# Patient Record
Sex: MALE | Race: WHITE | NOT HISPANIC OR LATINO | Employment: STUDENT | URBAN - METROPOLITAN AREA
[De-identification: names, ages, dates, MRNs, and addresses within clinical notes are randomized per-mention and may not be internally consistent; named-entity substitution may affect disease eponyms.]

---

## 2022-03-08 ENCOUNTER — HOSPITAL ENCOUNTER (EMERGENCY)
Facility: HOSPITAL | Age: 22
Discharge: HOME | End: 2022-03-08
Attending: EMERGENCY MEDICINE | Admitting: EMERGENCY MEDICINE

## 2022-03-08 ENCOUNTER — APPOINTMENT (OUTPATIENT)
Dept: CT IMAGING | Facility: HOSPITAL | Age: 22
End: 2022-03-08

## 2022-03-08 VITALS
RESPIRATION RATE: 16 BRPM | TEMPERATURE: 98 F | SYSTOLIC BLOOD PRESSURE: 133 MMHG | DIASTOLIC BLOOD PRESSURE: 78 MMHG | WEIGHT: 195 LBS | OXYGEN SATURATION: 97 % | BODY MASS INDEX: 27.3 KG/M2 | HEART RATE: 68 BPM | HEIGHT: 71 IN

## 2022-03-08 DIAGNOSIS — S06.6X0A SUBARACHNOID HEMORRHAGE FOLLOWING INJURY, NO LOSS OF CONSCIOUSNESS, INITIAL ENCOUNTER: Primary | ICD-10-CM

## 2022-03-08 PROCEDURE — 99283 EMERGENCY DEPT VISIT LOW MDM: CPT

## 2022-03-08 PROCEDURE — 63710000001 PREDNISONE PER 1 MG: Performed by: EMERGENCY MEDICINE

## 2022-03-08 PROCEDURE — 70450 CT HEAD/BRAIN W/O DYE: CPT

## 2022-03-08 RX ORDER — PREDNISONE 20 MG/1
20 TABLET ORAL ONCE
Status: COMPLETED | OUTPATIENT
Start: 2022-03-08 | End: 2022-03-08

## 2022-03-08 RX ADMIN — PREDNISONE 20 MG: 20 TABLET ORAL at 18:59

## 2022-03-08 NOTE — DISCHARGE INSTRUCTIONS
Do not engage in any contact sports or even activities that could lead to head injury until cleared by neurosurgery.    Follow-up with neurosurgery in roughly 1 week.  Their office will be calling you to arrange for follow-up and for repeat head CT scan.  If they do not call you within 48 hours please call them to ensure follow-up.

## 2022-03-08 NOTE — ED PROVIDER NOTES
EMERGENCY DEPARTMENT ENCOUNTER    Pt Name: Paul Billy  MRN: [id number]  Pt :   2000  Room Number:  RW1/R1  Date of encounter:  3/8/2022  PCP: Provider, No Known  ED Provider: Rudy Higginbotham MD    Historian: Patient      HPI:  Chief Complaint: Head injury        Context: Paul Billy is a 21 y.o. male who presents to the ED c/o head injury sustained 4 days ago while at a baseball field.  A ball was hit hard in his direction.  It came over a protective barrier and struck him on the right side of his temple.  He had no loss of consciousness but pretty significant pain initially.  He was not wearing a helmet at the time.  He notes persistent headache which is relatively mild, 2 out of 10 on the pain scale, when not engaging in screen time but this pain worsens significantly with screen time.  He has been slightly confused since the injury.  He has had no ataxia.  He has taken ibuprofen for discomfort and this has helped.      PAST MEDICAL HISTORY  No past medical history on file.      PAST SURGICAL HISTORY  No past surgical history on file.      FAMILY HISTORY  No family history on file.      SOCIAL HISTORY  Social History     Socioeconomic History   • Marital status: Single         ALLERGIES  Patient has no known allergies.        REVIEW OF SYSTEMS  Review of Systems       All systems reviewed and negative except for those discussed in HPI.       PHYSICAL EXAM    I have reviewed the triage vital signs and nursing notes.    ED Triage Vitals [22 1718]   Temp Heart Rate Resp BP SpO2   98 °F (36.7 °C) 68 16 133/78 97 %      Temp src Heart Rate Source Patient Position BP Location FiO2 (%)   Oral Monitor Sitting Left arm --       Physical Exam  GENERAL:   Appears pleasant, nontoxic  HENT: Nares patent.  Tenderness to palpation right parietal region with mild swelling.  No raccoon eyes or saleh signs.  EYES: No scleral icterus.  CV: Regular rhythm, regular rate.  2+ radial pulse  RESPIRATORY: Normal  effort.  No audible wheezes, rales or rhonchi.  ABDOMEN: Soft, nontender  MUSCULOSKELETAL: No deformities.  No midline cervical or thoracic tenderness to palpation  NEURO: Alert, oriented x3, moves all extremities, follows commands.  SKIN: Warm, dry, no rash visualized.  No contused tissue can be visualized.        LAB RESULTS  No results found for this or any previous visit (from the past 24 hour(s)).    If labs were ordered, I independently reviewed the results.        RADIOLOGY  No Radiology Exams Resulted Within Past 24 Hours    I ordered and reviewed the above noted radiographic studies.      I viewed images of CT head which showed no bleed or skull fracture per my independent interpretation.    See radiologist's dictation for official interpretation.        PROCEDURES    Procedures    No orders to display       MEDICATIONS GIVEN IN ER    Medications   predniSONE (DELTASONE) tablet 20 mg (20 mg Oral Given 3/8/22 1859)         PROGRESS, DATA ANALYSIS, CONSULTS, AND MEDICAL DECISION MAKING    All labs have been independently reviewed by me.  All radiology studies have been reviewed by me and the radiologist dictating the report.   EKG's have been independently viewed and interpreted by me.      Differential diagnoses: Intracranial bleed versus skull fracture versus concussion      ED Course as of 03/10/22 0020   Tue Mar 08, 2022   1817 I spoke with Dr. Albarran, radiology about the CT scan abnormalities.  I have paged Dr. Ellis, neurosurgery to discuss ongoing care. [MS]   1835 I spoke with John Nguyen PA-C for Dr. Ellis and then spoke with Dr. Ellis as well.  They recommend office follow-up in 1 week.  Patrick will call to make arrangements and obtain a repeat head CT prior to that appointment.  Their office will call to schedule the patient. [MS]   1847 Salas & Gaitan Classification of Subarachnoid Hemorrhage - MDCalc  Calculated on Mar 08 2022 6:47 PM  1 points -> Patients with Grade I have an approximate  30% mortality. [MS]   1847 Intracerebral Hemorrhage (ICH) Score - MDCalc  Calculated on Mar 08 2022 6:47 PM  0 points -> 0% mortality. Please note that the ICH score is primarily used as a clinical grading scale and communication tool.  It is not meant to provide prognostic information, and should not be used as a primary means to predict the outcomes of patients with ICH. [MS]      ED Course User Index  [MS] Rudy Higginbotham MD             AS OF 00:20 EST VITALS:    BP - 133/78  HR - 68  TEMP - 98 °F (36.7 °C) (Oral)  O2 SATS - 97%                  DIAGNOSIS  Final diagnoses:   Subarachnoid hemorrhage following injury, no loss of consciousness, initial encounter (HCC)         DISPOSITION  DISCHARGE    Patient discharged in stable condition.    Reviewed implications of results, diagnosis, meds, responsibility to follow up, warning signs and symptoms of possible worsening, potential complications and reasons to return to ER.    Patient/Family voiced understanding of above instructions.    Discussed plan for discharge, as there is no emergent indication for admission.  Pt/family is agreeable and understands need for follow up and possible repeat testing.  Pt/family is aware that discharge does not mean that nothing is wrong but that it indicates no emergency is currently present that requires admission and they must continue care with follow-up as given below or with a physician of their choice.     FOLLOW-UP  Vidal Ellis MD  [facility] [place] [address]  [phone]    In 1 week      [facility] Emergency Department  [address] [facility] [id number] [phone]    IF YOU HAVE ANY CONCERN OF WORSENING CONDITION         Medication List      No changes were made to your prescriptions during this visit.                  Rudy Higginbotham MD  03/10/22 0020

## 2022-03-09 ENCOUNTER — TELEPHONE (OUTPATIENT)
Dept: NEUROSURGERY | Facility: CLINIC | Age: 22
End: 2022-03-09

## 2022-03-09 DIAGNOSIS — I60.9 SUBARACHNOID HEMORRHAGE: Primary | ICD-10-CM

## 2022-03-09 NOTE — TELEPHONE ENCOUNTER
Per John patient is to have a 1 week hospital follow up with a CT Head. Can someone please enter orders for scheduling?

## 2022-03-17 ENCOUNTER — HOSPITAL ENCOUNTER (OUTPATIENT)
Dept: CT IMAGING | Facility: HOSPITAL | Age: 22
Discharge: HOME | End: 2022-03-17
Admitting: NEUROLOGICAL SURGERY

## 2022-03-17 ENCOUNTER — OFFICE VISIT (OUTPATIENT)
Dept: NEUROSURGERY | Facility: CLINIC | Age: 22
End: 2022-03-17

## 2022-03-17 VITALS
HEIGHT: 71 IN | TEMPERATURE: 98.9 F | SYSTOLIC BLOOD PRESSURE: 110 MMHG | WEIGHT: 191.4 LBS | BODY MASS INDEX: 26.8 KG/M2 | DIASTOLIC BLOOD PRESSURE: 74 MMHG

## 2022-03-17 DIAGNOSIS — I60.9 SUBARACHNOID HEMORRHAGE: Primary | ICD-10-CM

## 2022-03-17 DIAGNOSIS — I60.9 SUBARACHNOID HEMORRHAGE: ICD-10-CM

## 2022-03-17 PROCEDURE — 70450 CT HEAD/BRAIN W/O DYE: CPT

## 2022-03-17 PROCEDURE — 99204 OFFICE O/P NEW MOD 45 MIN: CPT | Performed by: PHYSICIAN ASSISTANT

## 2022-03-17 NOTE — PROGRESS NOTES
Patient: Paul Billy  : 2000    Primary Care Provider: Provider, No Known      Chief Complaint: None    History of Present Illness:       Patient is a very nice 21-year-old gentleman who was struck in right temple area off of the batted ball.  Patient had his back turned in the screen that was supposed to protect him failed to deflect the ball.  This hit him in the head.  4 days later he presented with headache to the ED showing a traumatic subarachnoid hemorrhage in the right temporal area.  Patient is back today with repeat CT.  Patient has had no signs or symptoms of postconcussive syndrome and headaches have all improved.  Patient denies any nausea or vomiting.    Patient is here basically for a return back to activity.  Patient's school, [employer], have a post concussion protocol prior to returning to sports/athletics.    From a neurosurgical perspective he is well-healed and is at low risk for any further issues    Review of Systems   Constitutional: Negative for activity change, appetite change, chills, diaphoresis, fatigue, fever and unexpected weight change.   HENT: Negative for congestion, dental problem, drooling, ear discharge, ear pain, facial swelling, hearing loss, mouth sores, nosebleeds, postnasal drip, rhinorrhea, sinus pressure, sinus pain, sneezing, sore throat, tinnitus, trouble swallowing and voice change.    Eyes: Negative for photophobia, pain, discharge, redness, itching and visual disturbance.   Respiratory: Negative for apnea, cough, choking, chest tightness, shortness of breath, wheezing and stridor.    Cardiovascular: Negative for chest pain, palpitations and leg swelling.   Gastrointestinal: Negative for abdominal distention, abdominal pain, anal bleeding, blood in stool, constipation, diarrhea, nausea, rectal pain and vomiting.   Endocrine: Negative for cold intolerance, heat intolerance, polydipsia, polyphagia and polyuria.   Genitourinary: Negative for  "decreased urine volume, difficulty urinating, dysuria, enuresis, flank pain, frequency, genital sores, hematuria and urgency.   Musculoskeletal: Negative for arthralgias, back pain, gait problem, joint swelling, myalgias, neck pain and neck stiffness.   Skin: Negative for color change, pallor, rash and wound.   Allergic/Immunologic: Negative for environmental allergies, food allergies and immunocompromised state.   Neurological: Negative for dizziness, tremors, seizures, syncope, facial asymmetry, speech difficulty, weakness, light-headedness, numbness and headaches.   Hematological: Negative for adenopathy. Does not bruise/bleed easily.   Psychiatric/Behavioral: Negative for agitation, behavioral problems, confusion, decreased concentration, dysphoric mood, hallucinations, self-injury, sleep disturbance and suicidal ideas. The patient is not nervous/anxious and is not hyperactive.        Past Medical History:     Past Medical History:   Diagnosis Date   • Concussion 03/08/2022   • Hemorrhage 03/08/2022       Family History:   History reviewed. No pertinent family history.    Social History:    reports that he has never smoked. He has never used smokeless tobacco. He reports current alcohol use of about 2.0 standard drinks of alcohol per week. He reports that he does not use drugs.   SMOKING STATUS: Non-smoker    Surgical History:     Past Surgical History:   Procedure Laterality Date   • FRACTURE SURGERY Left 2008       Allergies:   Patient has no known allergies.    Physical Exam:    Vital Signs:/74 (BP Location: Left arm, Patient Position: Sitting, Cuff Size: Adult)   Temp 98.9 °F (37.2 °C) (Infrared)   Ht 180.3 cm (71\")   Wt 86.8 kg (191 lb 6.4 oz)   BMI 26.69 kg/m²    BMI: Body mass index is 26.69 kg/m².     GENERAL:           The patient is in no acute distress, and is able to answer all questions appropriately.    Neck:          Supple without lymphadenopathy    Cardiovascular:       Peripheral " pulses 2+ at dorsalis pedis and posterior tibialis    Lungs:         Breathing unlabored    Musculoskeletal:            strength is 5 out of 5 bilaterally.        Shoulder abduction is 5 out of 5.         Dorsiflexion is 5/5 Bilaterally       Plantarflexion is 5/5 bilaterally       Hip Flexion 5/5 bilaterally.         The patient´s gait is normal without antalgia.    Neurologic:          The patient is alert and oriented by 3.          Pupils are equal and reactive to light.         Visual fields are full.         Extraocular movements are intact without nystagmus.         There is no evidence of central motor drift. No facial droop.  No difficulty with rapid alternating movements.         Sensation is equal bilaterally with no deficit.           Reflexes:  2+ through out    CRANIAL NERVES:         Cranial nerve II: Visual fields are full to confrontation.       Cranial nerves III, IV and VI: PERRLA DC.  Extraocular movements are intact.  Nystagmus is not present.       Cranial nerve V: Facial sensation is intact to light touch.       Cranial nerve VII: Muscles of facial expression revealed no asymmetry.       Cranial nerve VIII: Hearing is intact to finger rub bilaterally.       Cranial nerve IX and X: Palate elevates symmetrically.        Cranial nerve XI: Shoulder shrug is intact.       Cranial nerve XII: Tongue is midline without evidence of Atrophy or fasciculation.        Medical Decision Making    Data Review:   CT of the head reviewed and compared with 1 from 3/8/2022.  Complete resolution of the right temporal SAH noted    Diagnosis:   Traumatic SAH   Resolved    Treatment Options:   From our standpoint is okay to return to athletics with the postconcussive protocol that [place] currently uses.  From neurosurgical perspective he can follow-up with us on an as-needed basis.    Patient's Body mass index is 26.69 kg/m². indicating that he is overweight (BMI 25-29.9). Patient's (Body mass index is  26.69 kg/m².) indicates that they are overweight with health conditions that include none . Weight is unchanged. BMI is is above average; BMI management plan is completed. We discussed portion control and increasing exercise.     Diagnosis Plan   1. Subarachnoid hemorrhage (HCC)

## 2022-10-10 ENCOUNTER — OFFICE VISIT (OUTPATIENT)
Dept: FAMILY MEDICINE CLINIC | Facility: CLINIC | Age: 22
End: 2022-10-10

## 2022-10-10 VITALS
OXYGEN SATURATION: 97 % | BODY MASS INDEX: 26.83 KG/M2 | SYSTOLIC BLOOD PRESSURE: 112 MMHG | HEART RATE: 84 BPM | TEMPERATURE: 97.8 F | DIASTOLIC BLOOD PRESSURE: 62 MMHG | RESPIRATION RATE: 18 BRPM | WEIGHT: 192.4 LBS

## 2022-10-10 DIAGNOSIS — U07.1 COVID-19 VIRUS INFECTION: Primary | ICD-10-CM

## 2022-10-10 PROCEDURE — 99203 OFFICE O/P NEW LOW 30 MIN: CPT | Performed by: FAMILY MEDICINE

## 2022-10-10 NOTE — PROGRESS NOTES
Subjective   Paul Billy is a 22 y.o. male    COVID-19    History of Present Illness  The patient needs COVID clearance as a [employer]  to return to sports participation following a positive COVID test on 09/20/2022. The patient said he is doing well. He denies having a fever, but had body chills for 1 day. The patient said he had a cough for 5 to 6 days. He denies any painful breathing, shortness of breath, or sore throat. The patient denies any body or muscle aches. He denies losing his taste or smell. The patient denies any changes with his vision. He denies any heart palpitations.    The following portions of the patient's history were reviewed and updated as appropriate: allergies, current medications, past social history and problem list    Review of Systems   Constitutional: Negative.    HENT: Negative.    Eyes: Negative.    Respiratory: Negative.    Cardiovascular: Negative.    Gastrointestinal: Negative.    Endocrine: Negative.    Genitourinary: Negative.    Musculoskeletal: Negative.    Skin: Negative.    Allergic/Immunologic: Negative.    Neurological: Negative.    Hematological: Negative.    Psychiatric/Behavioral: Negative.    All other systems reviewed and are negative.      Objective     Vitals:    10/10/22 1605   BP: 112/62   Pulse: 84   Resp: 18   Temp: 97.8 °F (36.6 °C)   SpO2: 97%       Physical Exam  Vitals and nursing note reviewed.   Constitutional:       General: He is not in acute distress.     Appearance: Normal appearance. He is well-developed. He is not ill-appearing, toxic-appearing or diaphoretic.   HENT:      Head: Normocephalic and atraumatic.      Right Ear: External ear normal.      Left Ear: External ear normal.   Eyes:      Conjunctiva/sclera: Conjunctivae normal.      Pupils: Pupils are equal, round, and reactive to light.   Neck:      Thyroid: No thyromegaly.      Vascular: No carotid bruit.   Cardiovascular:      Rate and Rhythm: Normal rate and regular rhythm.       Pulses: Normal pulses.      Heart sounds: Normal heart sounds. No murmur heard.  Pulmonary:      Effort: Pulmonary effort is normal. No respiratory distress.      Breath sounds: Normal breath sounds.   Abdominal:      General: Bowel sounds are normal.      Palpations: Abdomen is soft. There is no mass.      Tenderness: There is no abdominal tenderness.   Musculoskeletal:         General: No swelling. Normal range of motion.      Cervical back: Normal range of motion and neck supple.   Lymphadenopathy:      Cervical: No cervical adenopathy.   Skin:     General: Skin is warm and dry.      Findings: No lesion or rash.   Neurological:      Mental Status: He is alert and oriented to person, place, and time.      Cranial Nerves: No cranial nerve deficit.      Sensory: No sensory deficit.      Motor: No weakness.      Coordination: Coordination normal.      Gait: Gait normal.      Deep Tendon Reflexes: Reflexes are normal and symmetric.   Psychiatric:         Mood and Affect: Mood normal.         Behavior: Behavior normal.         Thought Content: Thought content normal.         Judgment: Judgment normal.         Assessment & Plan     Problems Addressed this Visit    None  Visit Diagnoses     COVID-19 virus infection    -  Primary    Clinically resolved      Diagnoses       Codes Comments    COVID-19 virus infection    -  Primary ICD-10-CM: U07.1  ICD-9-CM: 079.89 Clinically resolved        Covid appears clinically resolved. Return to sports form completed.    I spent 25 minutes in patient care: Reviewing records prior to the visit, examining the patient, entering orders and documentation    Part of this note may be an electronic transcription/translation of spoken language to printed text using the Dragon Dictation System.         Transcribed from ambient dictation for CAM Wong MD by Alexandra Paris.  10/10/22   16:49 EDT  Patient or patient representative verbalized consent to the visit recording.  I have  personally performed the services described in this document as transcribed by the above individual, and it is both accurate and complete.

## 2022-10-14 ENCOUNTER — PATIENT ROUNDING (BHMG ONLY) (OUTPATIENT)
Dept: FAMILY MEDICINE CLINIC | Facility: CLINIC | Age: 22
End: 2022-10-14

## 2022-10-14 NOTE — PROGRESS NOTES
[facility] a My-Chart message has been sent to the patient for PATIENT ROUNDING with a my chart message